# Patient Record
Sex: FEMALE | Race: WHITE | Employment: PART TIME | URBAN - NONMETROPOLITAN AREA
[De-identification: names, ages, dates, MRNs, and addresses within clinical notes are randomized per-mention and may not be internally consistent; named-entity substitution may affect disease eponyms.]

---
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
## 2023-01-12 ENCOUNTER — NURSE ONLY (OUTPATIENT)
Dept: OBGYN | Age: 18
End: 2023-01-12
Payer: COMMERCIAL

## 2023-01-12 VITALS — BODY MASS INDEX: 44.57 KG/M2 | HEIGHT: 60 IN | WEIGHT: 227 LBS

## 2023-01-12 DIAGNOSIS — N93.8 DUB (DYSFUNCTIONAL UTERINE BLEEDING): Primary | ICD-10-CM

## 2023-01-12 PROCEDURE — 99211 OFF/OP EST MAY X REQ PHY/QHP: CPT | Performed by: ADVANCED PRACTICE MIDWIFE

## 2023-01-12 PROCEDURE — 96372 THER/PROPH/DIAG INJ SC/IM: CPT | Performed by: ADVANCED PRACTICE MIDWIFE

## 2023-01-12 RX ORDER — MEDROXYPROGESTERONE ACETATE 150 MG/ML
150 INJECTION, SUSPENSION INTRAMUSCULAR ONCE
Status: COMPLETED | OUTPATIENT
Start: 2023-01-12 | End: 2023-01-12

## 2023-01-12 RX ADMIN — MEDROXYPROGESTERONE ACETATE 150 MG: 150 INJECTION, SUSPENSION INTRAMUSCULAR at 16:53

## 2023-04-06 ENCOUNTER — NURSE ONLY (OUTPATIENT)
Dept: OBGYN | Age: 18
End: 2023-04-06
Payer: COMMERCIAL

## 2023-04-06 DIAGNOSIS — N93.8 DUB (DYSFUNCTIONAL UTERINE BLEEDING): Primary | ICD-10-CM

## 2023-04-06 PROCEDURE — 96372 THER/PROPH/DIAG INJ SC/IM: CPT | Performed by: ADVANCED PRACTICE MIDWIFE

## 2023-04-06 PROCEDURE — 99211 OFF/OP EST MAY X REQ PHY/QHP: CPT | Performed by: ADVANCED PRACTICE MIDWIFE

## 2023-04-06 RX ORDER — MEDROXYPROGESTERONE ACETATE 150 MG/ML
150 INJECTION, SUSPENSION INTRAMUSCULAR ONCE
Status: COMPLETED | OUTPATIENT
Start: 2023-04-06 | End: 2023-04-06

## 2023-04-06 RX ADMIN — MEDROXYPROGESTERONE ACETATE 150 MG: 150 INJECTION, SUSPENSION INTRAMUSCULAR at 17:09

## 2023-04-06 NOTE — PROGRESS NOTES
Nurse visit Injection    Date of service: 2023    Lm Gutiérrez  Is a 21 y.o. single female    PT's PCP is: Greg Lopez MD     : 2005                                             Subjective:       No LMP recorded. (Menstrual status: Irregular periods). Allergies: Patient has no known allergies. Chief Complaint   Patient presents with    Injections     Depo given Left Delt Office Supplied       Last Yearly date:  5/3/22    Last pap date and results: never    Last HPV date and results: never  ( if due for pap schedule pap)    LAST DEPO: 23 ( if past 13 weeks and not on period please talk with provider)      PE:  Vital Signs  not currently breastfeeding. Labs:    No results found for this visit on 23.       Yes  PT denies fever, chills, nausea and vomiting                            Assessment and Plan          Diagnosis Orders   1. DUB (dysfunctional uterine bleeding)  medroxyPROGESTERone (DEPO-PROVERA) injection 150 mg            injection was: Office supplied      NURSE: Tomas ALONSO

## 2023-06-27 ENCOUNTER — HOSPITAL ENCOUNTER (OUTPATIENT)
Age: 18
Setting detail: SPECIMEN
Discharge: HOME | End: 2023-06-27
Payer: COMMERCIAL

## 2023-06-27 ENCOUNTER — OFFICE VISIT (OUTPATIENT)
Dept: OBGYN | Age: 18
End: 2023-06-27
Payer: COMMERCIAL

## 2023-06-27 VITALS
HEIGHT: 60 IN | WEIGHT: 231 LBS | SYSTOLIC BLOOD PRESSURE: 112 MMHG | BODY MASS INDEX: 45.35 KG/M2 | DIASTOLIC BLOOD PRESSURE: 64 MMHG

## 2023-06-27 DIAGNOSIS — Z11.3 SCREEN FOR STD (SEXUALLY TRANSMITTED DISEASE): ICD-10-CM

## 2023-06-27 DIAGNOSIS — N93.8 DUB (DYSFUNCTIONAL UTERINE BLEEDING): Primary | ICD-10-CM

## 2023-06-27 PROCEDURE — 96372 THER/PROPH/DIAG INJ SC/IM: CPT | Performed by: ADVANCED PRACTICE MIDWIFE

## 2023-06-27 PROCEDURE — 87491 CHLMYD TRACH DNA AMP PROBE: CPT

## 2023-06-27 PROCEDURE — 87591 N.GONORRHOEAE DNA AMP PROB: CPT

## 2023-06-27 PROCEDURE — 99213 OFFICE O/P EST LOW 20 MIN: CPT | Performed by: ADVANCED PRACTICE MIDWIFE

## 2023-06-27 RX ORDER — MEDROXYPROGESTERONE ACETATE 150 MG/ML
150 INJECTION, SUSPENSION INTRAMUSCULAR ONCE
Status: COMPLETED | OUTPATIENT
Start: 2023-06-27 | End: 2023-06-27

## 2023-06-27 RX ADMIN — MEDROXYPROGESTERONE ACETATE 150 MG: 150 INJECTION, SUSPENSION INTRAMUSCULAR at 10:36

## 2023-06-28 LAB
CHLAMYDIA DNA UR QL NAA+PROBE: NEGATIVE
N GONORRHOEA DNA UR QL NAA+PROBE: NEGATIVE
SPECIMEN DESCRIPTION: NORMAL

## 2023-09-18 ENCOUNTER — NURSE ONLY (OUTPATIENT)
Dept: OBGYN | Age: 18
End: 2023-09-18
Payer: COMMERCIAL

## 2023-09-18 DIAGNOSIS — N93.8 DUB (DYSFUNCTIONAL UTERINE BLEEDING): Primary | ICD-10-CM

## 2023-09-18 PROCEDURE — 96372 THER/PROPH/DIAG INJ SC/IM: CPT | Performed by: ADVANCED PRACTICE MIDWIFE

## 2023-09-18 RX ORDER — MEDROXYPROGESTERONE ACETATE 150 MG/ML
150 INJECTION, SUSPENSION INTRAMUSCULAR ONCE
Status: COMPLETED | OUTPATIENT
Start: 2023-09-18 | End: 2023-09-18

## 2023-09-18 RX ADMIN — MEDROXYPROGESTERONE ACETATE 150 MG: 150 INJECTION, SUSPENSION INTRAMUSCULAR at 10:09

## 2023-09-18 NOTE — PROGRESS NOTES
Nurse visit Injection    Date of service: 2023    Joe Rain  Is a 19 y.o. single female    PT's PCP is: Ander Alonzo MD     : 2005                                             Subjective:       No LMP recorded. Patient has had an injection. Allergies: Patient has no known allergies. Chief Complaint   Patient presents with    Injections     Depo given Left Delt, Office Supplied       Last Yearly date:  2023    Last pap date and results: Never    Last HPV date and results: Never  ( if due for pap schedule pap)    LAST DEPO: 23 ( if past 13 weeks and not on period please talk with provider)      PE:  Vital Signs  not currently breastfeeding. Labs:    No results found for this visit on 23.       Yes  PT denies fever, chills, nausea and vomiting                            Assessment and Plan          Diagnosis Orders   1. DUB (dysfunctional uterine bleeding)  medroxyPROGESTERone (DEPO-PROVERA) injection 150 mg            injection was: Office supplied      NURSE: Monique ALONSO

## 2023-12-11 ENCOUNTER — NURSE ONLY (OUTPATIENT)
Dept: OBGYN | Age: 18
End: 2023-12-11
Payer: COMMERCIAL

## 2023-12-11 DIAGNOSIS — N93.8 DUB (DYSFUNCTIONAL UTERINE BLEEDING): Primary | ICD-10-CM

## 2023-12-11 PROCEDURE — 96372 THER/PROPH/DIAG INJ SC/IM: CPT | Performed by: ADVANCED PRACTICE MIDWIFE

## 2023-12-11 RX ORDER — MEDROXYPROGESTERONE ACETATE 150 MG/ML
150 INJECTION, SUSPENSION INTRAMUSCULAR ONCE
Status: COMPLETED | OUTPATIENT
Start: 2023-12-11 | End: 2023-12-11

## 2023-12-11 RX ADMIN — MEDROXYPROGESTERONE ACETATE 150 MG: 150 INJECTION, SUSPENSION INTRAMUSCULAR at 09:52

## 2023-12-11 NOTE — PROGRESS NOTES
Nurse visit Injection    Date of service: 2023    Yusef Snell  Is a 19 y.o. single female    PT's PCP is: Oswaldo Barber MD     : 2005                                             Subjective:       No LMP recorded. Allergies: Patient has no known allergies. Chief Complaint   Patient presents with    Injections     Depo given Right delt, Office Supplied       Last Yearly date:  2023    Last pap date and results: Never    Last HPV date and results: Never  ( if due for pap schedule pap)    LAST DEPO: 23 ( if past 13 weeks and not on period please talk with provider)      PE:  Vital Signs  not currently breastfeeding. Labs:    No results found for this visit on 23.       Yes  PT denies fever, chills, nausea and vomiting                            Assessment and Plan          Diagnosis Orders   1. DUB (dysfunctional uterine bleeding)  medroxyPROGESTERone (DEPO-PROVERA) injection 150 mg            injection was: Office supplied      NURSE: Jewel ALONSO

## 2024-03-04 ENCOUNTER — NURSE ONLY (OUTPATIENT)
Dept: OBGYN | Age: 19
End: 2024-03-04
Payer: COMMERCIAL

## 2024-03-04 DIAGNOSIS — N93.8 DUB (DYSFUNCTIONAL UTERINE BLEEDING): Primary | ICD-10-CM

## 2024-03-04 PROCEDURE — 96372 THER/PROPH/DIAG INJ SC/IM: CPT | Performed by: ADVANCED PRACTICE MIDWIFE

## 2024-03-04 RX ORDER — MEDROXYPROGESTERONE ACETATE 150 MG/ML
150 INJECTION, SUSPENSION INTRAMUSCULAR ONCE
Status: COMPLETED | OUTPATIENT
Start: 2024-03-04 | End: 2024-03-04

## 2024-03-04 RX ADMIN — MEDROXYPROGESTERONE ACETATE 150 MG: 150 INJECTION, SUSPENSION INTRAMUSCULAR at 10:40

## 2024-03-04 NOTE — PROGRESS NOTES
Nurse visit Injection    Date of service: 3/4/2024    Tahira Nair  Is a 18 y.o. single female    PT's PCP is: Deonte Rutledge MD     : 2005                                             Subjective:       No LMP recorded.     Allergies: Patient has no known allergies.    Chief Complaint   Patient presents with    Injections     Depo given Left Delt, Office Supplied       Last Yearly date:  2023    Last pap date and results: Never    Last HPV date and results: Never  ( if due for pap schedule pap)    LAST DEPO: 23 ( if past 13 weeks and not on period please talk with provider)      PE:  Vital Signs  not currently breastfeeding.     Labs:    No results found for this visit on 24.      Yes  PT denies fever, chills, nausea and vomiting                            Assessment and Plan          Diagnosis Orders   1. DUB (dysfunctional uterine bleeding)  medroxyPROGESTERone (DEPO-PROVERA) injection 150 mg            injection was: Office supplied      NURSE: Jered ALONSO

## 2024-05-30 ENCOUNTER — NURSE ONLY (OUTPATIENT)
Dept: OBGYN | Age: 19
End: 2024-05-30
Payer: COMMERCIAL

## 2024-05-30 DIAGNOSIS — N93.8 DUB (DYSFUNCTIONAL UTERINE BLEEDING): Primary | ICD-10-CM

## 2024-05-30 PROCEDURE — 96372 THER/PROPH/DIAG INJ SC/IM: CPT | Performed by: ADVANCED PRACTICE MIDWIFE

## 2024-05-30 RX ORDER — MEDROXYPROGESTERONE ACETATE 150 MG/ML
150 INJECTION, SUSPENSION INTRAMUSCULAR ONCE
Status: COMPLETED | OUTPATIENT
Start: 2024-05-30 | End: 2024-05-30

## 2024-05-30 RX ADMIN — MEDROXYPROGESTERONE ACETATE 150 MG: 150 INJECTION, SUSPENSION INTRAMUSCULAR at 10:03

## 2024-05-30 NOTE — PROGRESS NOTES
Nurse visit Injection    Date of service: 2024    Tahira Nair  Is a 18 y.o. single female    PT's PCP is: Deonte Rutledge MD     : 2005                                             Subjective:       No LMP recorded.     Allergies: Patient has no known allergies.    Chief Complaint   Patient presents with    Injections     Depo given in the right delt, Office Supplied       Last Yearly date:  2023    Last pap date and results: Never    Last HPV date and results: Never  ( if due for pap schedule pap)    LAST DEPO: 3/4/24 ( if past 13 weeks and not on period please talk with provider)      PE:  Vital Signs  not currently breastfeeding.     Labs:    No results found for this visit on 24.      Yes  PT denies fever, chills, nausea and vomiting                            Assessment and Plan          Diagnosis Orders   1. DUB (dysfunctional uterine bleeding)  medroxyPROGESTERone (DEPO-PROVERA) injection 150 mg            injection was: Office supplied      NURSE: Jered ALONSO

## 2024-08-28 ENCOUNTER — HOSPITAL ENCOUNTER (OUTPATIENT)
Age: 19
Setting detail: SPECIMEN
Discharge: HOME | End: 2024-08-28
Payer: COMMERCIAL

## 2024-08-28 ENCOUNTER — OFFICE VISIT (OUTPATIENT)
Dept: OBGYN | Age: 19
End: 2024-08-28
Payer: COMMERCIAL

## 2024-08-28 VITALS
WEIGHT: 241 LBS | HEIGHT: 60 IN | DIASTOLIC BLOOD PRESSURE: 82 MMHG | BODY MASS INDEX: 47.32 KG/M2 | SYSTOLIC BLOOD PRESSURE: 124 MMHG

## 2024-08-28 DIAGNOSIS — N93.8 DUB (DYSFUNCTIONAL UTERINE BLEEDING): ICD-10-CM

## 2024-08-28 DIAGNOSIS — Z01.419 ENCOUNTER FOR WELL WOMAN EXAM WITH ROUTINE GYNECOLOGICAL EXAM: Primary | ICD-10-CM

## 2024-08-28 DIAGNOSIS — Z01.419 ENCOUNTER FOR WELL WOMAN EXAM WITH ROUTINE GYNECOLOGICAL EXAM: ICD-10-CM

## 2024-08-28 PROCEDURE — 99395 PREV VISIT EST AGE 18-39: CPT | Performed by: ADVANCED PRACTICE MIDWIFE

## 2024-08-28 PROCEDURE — G0145 SCR C/V CYTO,THINLAYER,RESCR: HCPCS

## 2024-08-28 PROCEDURE — 96372 THER/PROPH/DIAG INJ SC/IM: CPT | Performed by: ADVANCED PRACTICE MIDWIFE

## 2024-08-28 RX ORDER — MEDROXYPROGESTERONE ACETATE 150 MG/ML
150 INJECTION, SUSPENSION INTRAMUSCULAR ONCE
Status: COMPLETED | OUTPATIENT
Start: 2024-08-28 | End: 2024-08-28

## 2024-08-28 RX ORDER — MEDROXYPROGESTERONE ACETATE 150 MG/ML
150 INJECTION, SUSPENSION INTRAMUSCULAR
COMMUNITY
End: 2024-08-28

## 2024-08-28 RX ADMIN — MEDROXYPROGESTERONE ACETATE 150 MG: 150 INJECTION, SUSPENSION INTRAMUSCULAR at 10:19

## 2024-08-28 ASSESSMENT — PATIENT HEALTH QUESTIONNAIRE - PHQ9
SUM OF ALL RESPONSES TO PHQ QUESTIONS 1-9: 0
SUM OF ALL RESPONSES TO PHQ QUESTIONS 1-9: 0
2. FEELING DOWN, DEPRESSED OR HOPELESS: NOT AT ALL
1. LITTLE INTEREST OR PLEASURE IN DOING THINGS: NOT AT ALL
SUM OF ALL RESPONSES TO PHQ QUESTIONS 1-9: 0
SUM OF ALL RESPONSES TO PHQ9 QUESTIONS 1 & 2: 0
SUM OF ALL RESPONSES TO PHQ QUESTIONS 1-9: 0

## 2024-08-28 NOTE — PATIENT INSTRUCTIONS
SURVEY:    You may be receiving a survey regarding your visit today.    Please complete the survey to enable us to provide the highest quality of care to you and your family.    We strive for all 5's - thank you    If you cannot score us a very good (5) on any question, please call the office to discuss this with Maria Guadalupe (our ). We would like to discuss how we could of made your experience a very good one.    Maria Guadalupe: [phone]    Thank you.

## 2024-08-28 NOTE — PROGRESS NOTES
YEARLY PHYSICAL    Date of service: 2024    Tahira Nair  Is a 19 y.o.  single, female    PT's PCP is: Deonte Rutledge MD     : 2005                                             Subjective:       No LMP recorded (lmp unknown). Patient has had an injection.     Are your menses regular: not applicable    OB History    Para Term  AB Living   0 0 0 0 0 0   SAB IAB Ectopic Molar Multiple Live Births   0 0 0 0 0 0        Social History     Tobacco Use   Smoking Status Never   Smokeless Tobacco Never        Social History     Substance and Sexual Activity   Alcohol Use Yes    Comment: occ       Family History   Problem Relation Age of Onset    No Known Problems Mother     No Known Problems Father        Any family history of breast or ovarian cancer: No    Any family history of blood clots: No    Allergies: Patient has no known allergies.      Current Outpatient Medications:     medroxyPROGESTERone (DEPO-PROVERA) 150 MG/ML injection, Inject 1 mL into the muscle every 3 months, Disp: , Rfl:     norgestimate-ethinyl estradiol (ORTHO-CYCLEN, 28,) 0.25-35 MG-MCG per tablet, Take 1 tablet by mouth daily (Patient not taking: Reported on 2023), Disp: 1 packet, Rfl: 3    Social History     Substance and Sexual Activity   Sexual Activity Yes    Partners: Male    Birth control/protection: Injection    Comment: depo       Any bleeding or pain with intercourse: No    Last Yearly date:  never     Last pap date : No cervical cancer screening on file     results: never     Last HPV date and results: n/a    Has pt ever had an abnormal:No    IF yes result and date: n/a     Mammogram Result (most recent): n/a  No results found for this or any previous visit from the past 3650 days.        DEXA Result (most recent):  No results found for this or any previous visit from the past 3650 days.        Last colorectal screen- type:n/a*      Do you do  10:15 AM

## 2024-09-09 LAB — CYTOLOGY REPORT: NORMAL

## 2024-11-20 ENCOUNTER — HOSPITAL ENCOUNTER (OUTPATIENT)
Age: 19
Setting detail: SPECIMEN
Discharge: HOME | End: 2024-11-20
Payer: COMMERCIAL

## 2024-11-20 ENCOUNTER — OFFICE VISIT (OUTPATIENT)
Dept: OBGYN | Age: 19
End: 2024-11-20
Payer: COMMERCIAL

## 2024-11-20 VITALS
WEIGHT: 242 LBS | BODY MASS INDEX: 47.51 KG/M2 | DIASTOLIC BLOOD PRESSURE: 82 MMHG | SYSTOLIC BLOOD PRESSURE: 122 MMHG | HEIGHT: 60 IN

## 2024-11-20 DIAGNOSIS — R87.615 ENCOUNTER FOR REPEAT PAPANICOLAOU SMEAR OF CERVIX DUE TO PREVIOUS UNSATISFACTORY RESULTS: ICD-10-CM

## 2024-11-20 DIAGNOSIS — N93.8 DUB (DYSFUNCTIONAL UTERINE BLEEDING): ICD-10-CM

## 2024-11-20 DIAGNOSIS — R87.615 ENCOUNTER FOR REPEAT PAPANICOLAOU SMEAR OF CERVIX DUE TO PREVIOUS UNSATISFACTORY RESULTS: Primary | ICD-10-CM

## 2024-11-20 PROCEDURE — 1036F TOBACCO NON-USER: CPT | Performed by: ADVANCED PRACTICE MIDWIFE

## 2024-11-20 PROCEDURE — 99213 OFFICE O/P EST LOW 20 MIN: CPT | Performed by: ADVANCED PRACTICE MIDWIFE

## 2024-11-20 PROCEDURE — G8417 CALC BMI ABV UP PARAM F/U: HCPCS | Performed by: ADVANCED PRACTICE MIDWIFE

## 2024-11-20 PROCEDURE — 96372 THER/PROPH/DIAG INJ SC/IM: CPT | Performed by: ADVANCED PRACTICE MIDWIFE

## 2024-11-20 PROCEDURE — G8484 FLU IMMUNIZE NO ADMIN: HCPCS | Performed by: ADVANCED PRACTICE MIDWIFE

## 2024-11-20 PROCEDURE — G8427 DOCREV CUR MEDS BY ELIG CLIN: HCPCS | Performed by: ADVANCED PRACTICE MIDWIFE

## 2024-11-20 PROCEDURE — 88175 CYTOPATH C/V AUTO FLUID REDO: CPT

## 2024-11-20 RX ORDER — MEDROXYPROGESTERONE ACETATE 150 MG/ML
150 INJECTION, SUSPENSION INTRAMUSCULAR
COMMUNITY

## 2024-11-20 RX ORDER — MEDROXYPROGESTERONE ACETATE 150 MG/ML
150 INJECTION, SUSPENSION INTRAMUSCULAR ONCE
Status: COMPLETED | OUTPATIENT
Start: 2024-11-20 | End: 2024-11-20

## 2024-11-20 RX ADMIN — MEDROXYPROGESTERONE ACETATE 150 MG: 150 INJECTION, SUSPENSION INTRAMUSCULAR at 09:59

## 2024-11-20 NOTE — PROGRESS NOTES
PROBLEM VISIT     Date of service: 2024    Tahira Nair  Is a 19 y.o. single, female    PT's PCP is: Deonte Rutledge MD     : 2005                                             Subjective:       No LMP recorded (lmp unknown). Patient has had an injection.   OB History    Para Term  AB Living   0 0 0 0 0 0   SAB IAB Ectopic Molar Multiple Live Births   0 0 0 0 0 0        Social History     Tobacco Use   Smoking Status Never   Smokeless Tobacco Never        Social History     Substance and Sexual Activity   Alcohol Use Yes    Comment: occ       Allergies: Patient has no known allergies.      Current Outpatient Medications:     medroxyPROGESTERone (DEPO-PROVERA) 150 MG/ML injection, Inject 1 mL into the muscle every 3 months, Disp: , Rfl:     Social History     Substance and Sexual Activity   Sexual Activity Yes    Partners: Male    Birth control/protection: Injection    Comment: depo       Last Yearly date:  24    Last pap date and results: 24 UNSAT     Last HPV date and results: never     Chief Complaint   Patient presents with    Gynecologic Exam     Repeat pap smear, last pap was unsat     Irregular Menses     Depo injection, OS, Right deltoid.         PE:  Vital Signs  Blood pressure 122/82, height 1.524 m (5'), weight 109.8 kg (242 lb), not currently breastfeeding.  Estimated body mass index is 47.26 kg/m² as calculated from the following:    Height as of this encounter: 1.524 m (5').    Weight as of this encounter: 109.8 kg (242 lb).    No data recorded    NURSE: KAYCE     HPI: Patient here today for repeat pap as previous pap was unsatisfactory. Patient denies issues or concerns.    Yes PT denies fever, chills, nausea and vomiting       Objective     Pelvic Exam: GENITAL/URINARY:  External Genitalia:  General appearance; normal, Hair distribution; normal, Lesions absent  Urethral Meatus:  Size normal, Location normal, Lesions absent, Prolapse absent  Urethra:  Masses

## 2024-12-03 LAB — CYTOLOGY REPORT: NORMAL

## 2024-12-04 RX ORDER — FLUCONAZOLE 150 MG/1
150 TABLET ORAL ONCE
Qty: 1 TABLET | Refills: 0 | Status: SHIPPED | OUTPATIENT
Start: 2024-12-04 | End: 2024-12-04

## 2024-12-05 LAB
HPV I/H RISK 4 DNA CVX QL NAA+PROBE: NOT DETECTED
HPV SAMPLE: NORMAL
HPV, INTERPRETATION: NORMAL
HPV16 DNA CVX QL NAA+PROBE: NOT DETECTED
HPV18 DNA CVX QL NAA+PROBE: NOT DETECTED
SPECIMEN DESCRIPTION: NORMAL

## 2025-02-12 ENCOUNTER — NURSE ONLY (OUTPATIENT)
Dept: OBGYN | Age: 20
End: 2025-02-12
Payer: COMMERCIAL

## 2025-02-12 DIAGNOSIS — N93.8 DUB (DYSFUNCTIONAL UTERINE BLEEDING): Primary | ICD-10-CM

## 2025-02-12 PROCEDURE — 96372 THER/PROPH/DIAG INJ SC/IM: CPT | Performed by: ADVANCED PRACTICE MIDWIFE

## 2025-02-12 RX ORDER — MEDROXYPROGESTERONE ACETATE 150 MG/ML
150 INJECTION, SUSPENSION INTRAMUSCULAR ONCE
Status: COMPLETED | OUTPATIENT
Start: 2025-02-12 | End: 2025-02-12

## 2025-02-12 RX ADMIN — MEDROXYPROGESTERONE ACETATE 150 MG: 150 INJECTION, SUSPENSION INTRAMUSCULAR at 10:23

## 2025-02-12 NOTE — PROGRESS NOTES
Nurse visit Injection    Date of service: 2025    Tahira Nair  Is a 19 y.o. single female    PT's PCP is: Deonte Rutledge MD     : 2005                                             Subjective:       No LMP recorded.     Allergies: Patient has no known allergies.    Chief Complaint   Patient presents with    Injections     Pt is here today for depo, depo given in L delt.       Last Yearly date:  24    Last pap date : Date of last Cervical Cancer screen (HPV or PAP): 2024     results: ascus    Last HPV date and results: 24(-)    LAST DEPO: 24 ( if past 13 weeks and not on period please talk with provider)      PE:  Vital Signs  not currently breastfeeding.     Labs:    No results found for this visit on 25.      Yes  PT denies fever, chills, nausea and vomiting                            Assessment and Plan          Diagnosis Orders   1. DUB (dysfunctional uterine bleeding)              injection was: Office supplied      NURSE: Kindra ALONSO

## 2025-03-12 ENCOUNTER — HOSPITAL ENCOUNTER
Dept: HOSPITAL 101 - LAB | Age: 20
Discharge: HOME | End: 2025-03-12
Payer: COMMERCIAL

## 2025-03-12 DIAGNOSIS — Z00.00: Primary | ICD-10-CM

## 2025-03-12 DIAGNOSIS — D64.9: ICD-10-CM

## 2025-03-12 LAB
ADD MANUAL DIFF: NO
ALANINE AMINOTRANSFERASE: 43 U/L (ref 14–59)
ALBUMIN GLOBULIN RATIO: 1.1
ALBUMIN LEVEL: 4.1 G/DL (ref 3.4–5)
ALKALINE PHOSPHATASE: 114 U/L (ref 46–116)
ANION GAP: 15.6
ASPARTATE AMINO TRANSFERASE: 37 U/L (ref 15–37)
BLOOD UREA NITROGEN: 13 MG/DL (ref 7–18)
CALCIUM: 9.1 MG/DL (ref 8.5–10.1)
CARBON DIOXIDE: 22.2 MMOL/L (ref 21–32)
CHLORIDE: 105 MMOL/L (ref 98–107)
CHOLESTEROL: 135 MG/DL (ref ?–200)
CO2 BLD-SCNC: 22.2 MMOL/L (ref 21–32)
ESTIMATED GFR (AFRICAN AMERICA: >60
ESTIMATED GFR (NON-AFRICAN AME: >60
FREE T3: 3.03 PG/ML (ref 2.18–3.98)
GLOBULIN: 3.7 G/DL
GLUCOSE BLD-MCNC: 93 MG/DL (ref 74–106)
HCT VFR BLD CALC: 42.8 % (ref 36–48)
HDL CHOLESTEROL: 51 MG/DL (ref 40–60)
HEMATOCRIT: 42.8 % (ref 36–48)
HEMOGLOBIN: 14.4 G/DL (ref 12–16)
IMMATURE GRANULOCYTES ABS AUTO: 0.01 10^3/UL (ref 0–0.03)
IMMATURE GRANULOCYTES PCT AUTO: 0.2 % (ref 0–0.5)
IRON: 88 UG/DL (ref 50–170)
LYMPHOCYTES  ABSOLUTE AUTO: 2.1 10^3/UL (ref 1.2–3.8)
MCV RBC: 91.1 FL (ref 81–99)
MEAN CORPUSCULAR HEMOGLOBIN: 30.6 PG (ref 26.7–34)
MEAN CORPUSCULAR HGB CONC: 33.6 G/DL (ref 29.9–35.2)
MEAN CORPUSCULAR VOLUME: 91.1 FL (ref 81–99)
PLATELET # BLD: 303 10^3/UL (ref 150–450)
PLATELET COUNT: 303 10^3/UL (ref 150–450)
POTASSIUM SERPLBLD-SCNC: 4.8 MMOL/L (ref 3.5–5.1)
POTASSIUM: 4.8 MMOL/L (ref 3.5–5.1)
RED BLOOD COUNT: 4.7 10^6/UL (ref 4.2–5.4)
SODIUM BLD-SCNC: 138 MMOL/L (ref 136–145)
SODIUM: 138 MMOL/L (ref 136–145)
THYROID STIMULATING HORMONE: 0.95 UIU/ML (ref 0.36–3.74)
TOTAL PROTEIN: 7.8 G/DL (ref 6.4–8.2)
TRIGLYCERIDES: 38 MG/DL (ref ?–150)
VLDL CHOLESTEROL: 7.6 MG/DL
WBC # BLD: 6 10^3/UL (ref 4–11)
WHITE BLOOD COUNT: 6 10^3/UL (ref 4–11)

## 2025-03-12 PROCEDURE — 80061 LIPID PANEL: CPT

## 2025-03-12 PROCEDURE — 83525 ASSAY OF INSULIN: CPT

## 2025-03-12 PROCEDURE — 84436 ASSAY OF TOTAL THYROXINE: CPT

## 2025-03-12 PROCEDURE — 36415 COLL VENOUS BLD VENIPUNCTURE: CPT

## 2025-03-12 PROCEDURE — 84481 FREE ASSAY (FT-3): CPT

## 2025-03-12 PROCEDURE — 83036 HEMOGLOBIN GLYCOSYLATED A1C: CPT

## 2025-03-12 PROCEDURE — 80053 COMPREHEN METABOLIC PANEL: CPT

## 2025-03-12 PROCEDURE — 83540 ASSAY OF IRON: CPT

## 2025-03-12 PROCEDURE — 85025 COMPLETE CBC W/AUTO DIFF WBC: CPT

## 2025-03-12 PROCEDURE — 84443 ASSAY THYROID STIM HORMONE: CPT

## 2025-03-13 ENCOUNTER — HOSPITAL ENCOUNTER
Dept: HOSPITAL 101 - LAB | Age: 20
Discharge: HOME | End: 2025-03-13
Payer: COMMERCIAL

## 2025-03-13 DIAGNOSIS — Z00.00: Primary | ICD-10-CM

## 2025-03-13 DIAGNOSIS — D64.9: ICD-10-CM

## 2025-03-13 PROCEDURE — 83525 ASSAY OF INSULIN: CPT

## 2025-03-13 PROCEDURE — 36415 COLL VENOUS BLD VENIPUNCTURE: CPT

## 2025-05-12 ENCOUNTER — CLINICAL SUPPORT (OUTPATIENT)
Dept: OBGYN | Age: 20
End: 2025-05-12
Payer: COMMERCIAL

## 2025-05-12 DIAGNOSIS — N93.8 DUB (DYSFUNCTIONAL UTERINE BLEEDING): Primary | ICD-10-CM

## 2025-05-12 PROCEDURE — 96372 THER/PROPH/DIAG INJ SC/IM: CPT | Performed by: ADVANCED PRACTICE MIDWIFE

## 2025-05-12 RX ORDER — MEDROXYPROGESTERONE ACETATE 150 MG/ML
150 INJECTION, SUSPENSION INTRAMUSCULAR ONCE
Status: COMPLETED | OUTPATIENT
Start: 2025-05-12 | End: 2025-05-12

## 2025-05-12 RX ADMIN — MEDROXYPROGESTERONE ACETATE 150 MG: 150 INJECTION, SUSPENSION INTRAMUSCULAR at 10:28

## 2025-05-12 NOTE — PROGRESS NOTES
Nurse visit Injection    Date of service: 2025    Tahira Nair  Is a 19 y.o. single female    PT's PCP is: Deonte Rutledge MD     : 2005                                             Subjective:       No LMP recorded.     Allergies: Patient has no known allergies.    Chief Complaint   Patient presents with    Injections     Depo given in the Right Delt, Office Supplied       Last Yearly date:  24    Last pap date : Date of last Cervical Cancer screen (HPV or PAP): 2024     results: ascus    Last HPV date and results: 24(-)    LAST DEPO: 25 ( if past 13 weeks and not on period please talk with provider)      PE:  Vital Signs  not currently breastfeeding.     Labs:    No results found for this visit on 25.      Yes  PT denies fever, chills, nausea and vomiting                            Assessment and Plan          Diagnosis Orders   1. DUB (dysfunctional uterine bleeding)  medroxyPROGESTERone (DEPO-PROVERA) injection 150 mg            injection was: Office supplied      NURSE: Jered ALONSO

## 2025-07-30 ENCOUNTER — CLINICAL SUPPORT (OUTPATIENT)
Dept: OBGYN | Age: 20
End: 2025-07-30
Payer: COMMERCIAL

## 2025-07-30 DIAGNOSIS — N93.8 DUB (DYSFUNCTIONAL UTERINE BLEEDING): Primary | ICD-10-CM

## 2025-07-30 PROCEDURE — 96372 THER/PROPH/DIAG INJ SC/IM: CPT | Performed by: ADVANCED PRACTICE MIDWIFE

## 2025-07-30 RX ORDER — MEDROXYPROGESTERONE ACETATE 150 MG/ML
150 INJECTION, SUSPENSION INTRAMUSCULAR ONCE
Status: COMPLETED | OUTPATIENT
Start: 2025-07-30 | End: 2025-07-30

## 2025-07-30 RX ADMIN — MEDROXYPROGESTERONE ACETATE 150 MG: 150 INJECTION, SUSPENSION INTRAMUSCULAR at 09:14

## 2025-07-30 SDOH — ECONOMIC STABILITY: FOOD INSECURITY: WITHIN THE PAST 12 MONTHS, THE FOOD YOU BOUGHT JUST DIDN'T LAST AND YOU DIDN'T HAVE MONEY TO GET MORE.: NEVER TRUE

## 2025-07-30 SDOH — ECONOMIC STABILITY: FOOD INSECURITY: WITHIN THE PAST 12 MONTHS, YOU WORRIED THAT YOUR FOOD WOULD RUN OUT BEFORE YOU GOT MONEY TO BUY MORE.: NEVER TRUE

## 2025-07-30 ASSESSMENT — PATIENT HEALTH QUESTIONNAIRE - PHQ9
2. FEELING DOWN, DEPRESSED OR HOPELESS: NOT AT ALL
SUM OF ALL RESPONSES TO PHQ QUESTIONS 1-9: 0
1. LITTLE INTEREST OR PLEASURE IN DOING THINGS: NOT AT ALL
SUM OF ALL RESPONSES TO PHQ QUESTIONS 1-9: 0

## 2025-07-30 NOTE — PROGRESS NOTES
Nurse visit Injection    Date of service: 2025    Tahira Nair  Is a 20 y.o. single female    PT's PCP is: Deonte Rutledge MD     : 2005                                             Subjective:       No LMP recorded.     Allergies: Patient has no known allergies.    Chief Complaint   Patient presents with    Injections     Patient is here today for depo, depo given in left delt. Office supplied.       Last Yearly date:  24    Last pap date : Date of last Cervical Cancer screen (HPV or PAP): 2024     results: ascus    Last HPV date and results: 24(-)    LAST DEPO: 25 ( if past 13 weeks and not on period please talk with provider)      PE:  Vital Signs  not currently breastfeeding.     Labs:    No results found for this visit on 25.      Yes  PT denies fever, chills, nausea and vomiting                            Assessment and Plan          Diagnosis Orders   1. DUB (dysfunctional uterine bleeding)              injection was: Office supplied      NURSE: Kindra ALONSO